# Patient Record
Sex: MALE | URBAN - NONMETROPOLITAN AREA
[De-identification: names, ages, dates, MRNs, and addresses within clinical notes are randomized per-mention and may not be internally consistent; named-entity substitution may affect disease eponyms.]

---

## 2023-09-01 ENCOUNTER — OFFICE VISIT (OUTPATIENT)
Dept: URBAN - NONMETROPOLITAN AREA CLINIC 13 | Facility: CLINIC | Age: 23
End: 2023-09-01
Payer: COMMERCIAL

## 2023-09-01 VITALS
WEIGHT: 175 LBS | HEART RATE: 102 BPM | HEIGHT: 70 IN | SYSTOLIC BLOOD PRESSURE: 133 MMHG | BODY MASS INDEX: 25.05 KG/M2 | DIASTOLIC BLOOD PRESSURE: 96 MMHG

## 2023-09-01 DIAGNOSIS — K21.9 ACID REFLUX: ICD-10-CM

## 2023-09-01 DIAGNOSIS — Z46.59 ENCOUNTER FOR CARE RELATED TO FEEDING TUBE: ICD-10-CM

## 2023-09-01 PROBLEM — 128188000: Status: ACTIVE | Noted: 2023-09-01

## 2023-09-01 PROBLEM — 230435005: Status: ACTIVE | Noted: 2023-09-01

## 2023-09-01 PROBLEM — 235595009: Status: ACTIVE | Noted: 2023-09-01

## 2023-09-01 PROBLEM — 440419004: Status: ACTIVE | Noted: 2023-09-01

## 2023-09-01 PROCEDURE — 99204 OFFICE O/P NEW MOD 45 MIN: CPT

## 2023-09-01 RX ORDER — LANSOPRAZOLE 30 MG/1
TABLET, ORALLY DISINTEGRATING ORAL
Qty: 180 EACH | Status: ACTIVE | COMMUNITY

## 2023-09-01 RX ORDER — GABAPENTIN 250 MG/5ML
TAKE 8 MILLILITERS BY MOUTH AT BEDTIME SOLUTION ORAL
Qty: 240 MILLILITER | Refills: 5 | Status: ACTIVE | COMMUNITY

## 2023-09-01 RX ORDER — TRAZODONE HYDROCHLORIDE 100 MG/1
CRUSHED TWO TABLETS AND GIVE VIA G-TUBE AT BEDTIME TABLET ORAL
Qty: 60 EACH | Refills: 2 | Status: ACTIVE | COMMUNITY

## 2023-09-01 RX ORDER — VALPROIC ACID 250 MG/5ML
SOLUTION ORAL
Qty: 1200 MILLILITER | Status: ACTIVE | COMMUNITY

## 2023-09-01 RX ORDER — LEVETIRACETAM 100 MG/ML
TAKE 10 MILLILITERS (1,000 MG) BY ORAL ROUTE 2 TIMES PER DAY FOR 90 DAYS SOLUTION ORAL
Qty: 1800 MILLILITER | Refills: 1 | Status: ACTIVE | COMMUNITY

## 2023-09-01 RX ORDER — BACLOFEN 20 MG/1
TAKE ONE TABLET BY MOUTH THREE TIMES DAILY TABLET ORAL
Qty: 90 EACH | Refills: 1 | Status: ACTIVE | COMMUNITY

## 2023-09-01 RX ORDER — OMEPRAZOLE 20 MG/1
GIVE 5 ML VIA G-TUBE TWICE DAILY CAPSULE, DELAYED RELEASE ORAL
Qty: 30 EACH | Refills: 5 | Status: ACTIVE | COMMUNITY

## 2023-09-01 RX ORDER — GLYCOPYRROLATE 2 MG/1
TABLET ORAL
Qty: 120 TABLET | Status: ACTIVE | COMMUNITY

## 2023-09-01 RX ORDER — LANSOPRAZOLE 30 MG/1
1 TABLET TABLET, ORALLY DISINTEGRATING, DELAYED RELEASE ORAL ONCE A DAY
Qty: 90 TABLET | Refills: 3 | OUTPATIENT
Start: 2023-09-01

## 2023-09-01 RX ORDER — LORATADINE 10 MG
1 PACKET MIXED WITH 8 OUNCES OF FLUID TABLET,DISINTEGRATING ORAL ONCE A DAY
Qty: 90 | Refills: 3 | OUTPATIENT
Start: 2023-09-01 | End: 2024-08-26

## 2023-09-01 NOTE — HPI-TODAY'S VISIT:
9/1/2023 Jefe presents with his mother and caregiver to establish care previously managed by [facility].  He has a past medical history of cerebral palsy, epilepsy, developmental delay, eating disorder with gastrostomy feeding tube, hemiparesis with left-sided weakness, hydrocephalus, spasticity.  He is nonambulatory using wheelchair and is nonverbal.  He is also incontinent of urine and bowel. Currently he receives feedings through G-Tube with Levi button 18, 4.5 using Thrive through [facility] with [facility].  He does sometimes eat some foods by mouth now which is new for him.  He continues on Prevacid 30 mg for history of GERD and uses 1 cap of MiraLAX daily for bowel regimen.  His mother reports his bowels are well managed and they hold the day of MiraLAX if stool is too loose. Today we discussed obtaining records and ensuring they are able to get supplies with refills needed through our office. They will return in 6 months for regular follow up. BLAINE

## 2023-10-18 ENCOUNTER — TELEPHONE ENCOUNTER (OUTPATIENT)
Dept: URBAN - NONMETROPOLITAN AREA CLINIC 2 | Facility: CLINIC | Age: 23
End: 2023-10-18

## 2023-10-18 RX ORDER — LANSOPRAZOLE 30 MG/1
1 TABLET TABLET, ORALLY DISINTEGRATING, DELAYED RELEASE ORAL TWICE DAILY
Qty: 180 | Refills: 3
Start: 2023-09-01

## 2024-03-08 ENCOUNTER — OV EP (OUTPATIENT)
Dept: URBAN - NONMETROPOLITAN AREA CLINIC 13 | Facility: CLINIC | Age: 24
End: 2024-03-08
Payer: COMMERCIAL

## 2024-03-08 VITALS
HEIGHT: 70 IN | HEART RATE: 102 BPM | SYSTOLIC BLOOD PRESSURE: 128 MMHG | BODY MASS INDEX: 25.05 KG/M2 | DIASTOLIC BLOOD PRESSURE: 90 MMHG | WEIGHT: 175 LBS

## 2024-03-08 DIAGNOSIS — K21.9 GASTROESOPHAGEAL REFLUX DISEASE, UNSPECIFIED WHETHER ESOPHAGITIS PRESENT: ICD-10-CM

## 2024-03-08 DIAGNOSIS — Z46.59 ENCOUNTER FOR CARE RELATED TO FEEDING TUBE: ICD-10-CM

## 2024-03-08 DIAGNOSIS — G40.909 EPILEPSY UNDETERMINED AS TO FOCAL OR GENERALIZED: ICD-10-CM

## 2024-03-08 DIAGNOSIS — G80.9 CEREBRAL PALSY, UNSPECIFIED TYPE: ICD-10-CM

## 2024-03-08 PROCEDURE — 99213 OFFICE O/P EST LOW 20 MIN: CPT

## 2024-03-08 RX ORDER — GLYCOPYRROLATE 2 MG/1
TABLET ORAL
Qty: 120 TABLET | Status: ACTIVE | COMMUNITY

## 2024-03-08 RX ORDER — OMEPRAZOLE 20 MG/1
GIVE 5 ML VIA G-TUBE TWICE DAILY CAPSULE, DELAYED RELEASE ORAL
Qty: 30 EACH | Refills: 5 | Status: ACTIVE | COMMUNITY

## 2024-03-08 RX ORDER — BACLOFEN 20 MG/1
TAKE ONE TABLET BY MOUTH THREE TIMES DAILY TABLET ORAL
Qty: 90 EACH | Refills: 1 | Status: ACTIVE | COMMUNITY

## 2024-03-08 RX ORDER — LANSOPRAZOLE 30 MG/1
1 TABLET TABLET, ORALLY DISINTEGRATING, DELAYED RELEASE ORAL TWICE DAILY
Qty: 180 | Refills: 3 | Status: ACTIVE | COMMUNITY
Start: 2023-09-01

## 2024-03-08 RX ORDER — LEVETIRACETAM 100 MG/ML
TAKE 10 MILLILITERS (1,000 MG) BY ORAL ROUTE 2 TIMES PER DAY FOR 90 DAYS SOLUTION ORAL
Qty: 1800 MILLILITER | Refills: 1 | Status: ACTIVE | COMMUNITY

## 2024-03-08 RX ORDER — LANSOPRAZOLE 30 MG/1
1 TABLET TABLET, ORALLY DISINTEGRATING ORAL TWICE A DAY
Qty: 180 TABLET | Refills: 3

## 2024-03-08 RX ORDER — TRAZODONE HYDROCHLORIDE 100 MG/1
CRUSHED TWO TABLETS AND GIVE VIA G-TUBE AT BEDTIME TABLET ORAL
Qty: 60 EACH | Refills: 2 | Status: ACTIVE | COMMUNITY

## 2024-03-08 RX ORDER — LORATADINE 10 MG
1 PACKET MIXED WITH 8 OUNCES OF FLUID TABLET,DISINTEGRATING ORAL ONCE A DAY
Qty: 90 | Refills: 3 | Status: ACTIVE | COMMUNITY
Start: 2023-09-01 | End: 2024-08-26

## 2024-03-08 RX ORDER — LORATADINE 10 MG
1 PACKET MIXED WITH 8 OUNCES OF FLUID TABLET,DISINTEGRATING ORAL ONCE A DAY
Qty: 90 | Refills: 3
Start: 2023-09-01 | End: 2025-03-03

## 2024-03-08 RX ORDER — GABAPENTIN 250 MG/5ML
TAKE 8 MILLILITERS BY MOUTH AT BEDTIME SOLUTION ORAL
Qty: 240 MILLILITER | Refills: 5 | Status: ACTIVE | COMMUNITY

## 2024-03-08 RX ORDER — LANSOPRAZOLE 30 MG/1
TABLET, ORALLY DISINTEGRATING ORAL
Qty: 180 EACH | Status: ACTIVE | COMMUNITY

## 2024-03-08 RX ORDER — VALPROIC ACID 250 MG/5ML
SOLUTION ORAL
Qty: 1200 MILLILITER | Status: ACTIVE | COMMUNITY

## 2024-03-08 NOTE — HPI-TODAY'S VISIT:
9/1/2023 Jefe presents with his mother and caregiver to establish care previously managed by [facility].  He has a past medical history of cerebral palsy, epilepsy, developmental delay, eating disorder with gastrostomy feeding tube, hemiparesis with left-sided weakness, hydrocephalus, spasticity.  He is nonambulatory using wheelchair and is nonverbal.  He is also incontinent of urine and bowel. Currently he receives feedings through G-Tube with Levi button 18, 4.5 using [facility] through [facility] with [facility].  He does sometimes eat some foods by mouth now which is new for him.  He continues on Prevacid 30 mg for history of GERD and uses 1 cap of MiraLAX daily for bowel regimen.  His mother reports his bowels are well managed and they hold the day of MiraLAX if stool is too loose. Today we discussed obtaining records and ensuring they are able to get supplies with refills needed through our office. They will return in 6 months for regular follow up. SP  3/08/2024 Patient presents for  for followup for constipation and feeding tube.  Patient's caregiver denies any melena or rectal bleeding.  Patient states a good appetite and stable weight.  Bowel movements are regular and managed with Miralax. He will return in 1 yr.

## 2024-06-17 ENCOUNTER — TELEPHONE ENCOUNTER (OUTPATIENT)
Dept: URBAN - NONMETROPOLITAN AREA CLINIC 2 | Facility: CLINIC | Age: 24
End: 2024-06-17

## 2024-06-17 RX ORDER — LANSOPRAZOLE 30 MG/1
1 TABLET TABLET, ORALLY DISINTEGRATING, DELAYED RELEASE ORAL TWICE A DAY
Qty: 180 TABLET | Refills: 3 | OUTPATIENT
Start: 2024-06-17

## 2024-11-20 ENCOUNTER — TELEPHONE ENCOUNTER (OUTPATIENT)
Dept: URBAN - NONMETROPOLITAN AREA CLINIC 2 | Facility: CLINIC | Age: 24
End: 2024-11-20

## 2025-03-07 ENCOUNTER — DASHBOARD ENCOUNTERS (OUTPATIENT)
Age: 25
End: 2025-03-07

## 2025-03-07 ENCOUNTER — OFFICE VISIT (OUTPATIENT)
Dept: URBAN - NONMETROPOLITAN AREA CLINIC 13 | Facility: CLINIC | Age: 25
End: 2025-03-07
Payer: COMMERCIAL

## 2025-03-07 ENCOUNTER — TELEPHONE ENCOUNTER (OUTPATIENT)
Dept: URBAN - NONMETROPOLITAN AREA CLINIC 13 | Facility: CLINIC | Age: 25
End: 2025-03-07

## 2025-03-07 VITALS — WEIGHT: 160 LBS | HEIGHT: 70 IN | BODY MASS INDEX: 22.9 KG/M2

## 2025-03-07 DIAGNOSIS — G80.9 CEREBRAL PALSY, UNSPECIFIED TYPE: ICD-10-CM

## 2025-03-07 DIAGNOSIS — Z46.59 ENCOUNTER FOR CARE RELATED TO FEEDING TUBE: ICD-10-CM

## 2025-03-07 DIAGNOSIS — K21.9 GASTROESOPHAGEAL REFLUX DISEASE, UNSPECIFIED WHETHER ESOPHAGITIS PRESENT: ICD-10-CM

## 2025-03-07 PROCEDURE — 99213 OFFICE O/P EST LOW 20 MIN: CPT

## 2025-03-07 RX ORDER — LANSOPRAZOLE 30 MG/1
1 TABLET TABLET, ORALLY DISINTEGRATING ORAL TWICE A DAY
Qty: 180 TABLET | Refills: 3 | Status: ACTIVE | COMMUNITY

## 2025-03-07 RX ORDER — POLYETHYLENE GLYCOL 3350 17 G/DOSE
1 SCOOP MIXED WITH 8 OUNCES OF FLUID POWDER (GRAM) ORAL ONCE A DAY
Qty: 1 UNSPECIFIED | Refills: 5 | OUTPATIENT
Start: 2025-03-07 | End: 2025-09-03

## 2025-03-07 RX ORDER — BACLOFEN 20 MG/1
TAKE ONE TABLET BY MOUTH THREE TIMES DAILY TABLET ORAL
Qty: 90 EACH | Refills: 1 | Status: ACTIVE | COMMUNITY

## 2025-03-07 RX ORDER — OMEPRAZOLE 20 MG/1
1 CAPSULE 1/2 TO 1 HOUR BEFORE MORNING MEAL CAPSULE, DELAYED RELEASE ORAL ONCE A DAY
Qty: 90 CAPSULE | Refills: 3 | OUTPATIENT
Start: 2025-03-07

## 2025-03-07 RX ORDER — GLYCOPYRROLATE 2 MG/1
TABLET ORAL
Qty: 120 TABLET | Status: ACTIVE | COMMUNITY

## 2025-03-07 RX ORDER — TRAZODONE HYDROCHLORIDE 100 MG/1
CRUSHED TWO TABLETS AND GIVE VIA G-TUBE AT BEDTIME TABLET ORAL
Qty: 60 EACH | Refills: 2 | Status: ACTIVE | COMMUNITY

## 2025-03-07 RX ORDER — LANSOPRAZOLE 30 MG/1
1 TABLET TABLET, ORALLY DISINTEGRATING, DELAYED RELEASE ORAL TWICE A DAY
Qty: 180 TABLET | Refills: 3 | Status: ACTIVE | COMMUNITY
Start: 2024-06-17

## 2025-03-07 RX ORDER — OMEPRAZOLE 20 MG/1
GIVE 5 ML VIA G-TUBE TWICE DAILY CAPSULE, DELAYED RELEASE ORAL
Qty: 30 EACH | Refills: 5 | Status: ACTIVE | COMMUNITY

## 2025-03-07 RX ORDER — VALPROIC ACID 250 MG/5ML
SOLUTION ORAL
Qty: 1200 MILLILITER | Status: ACTIVE | COMMUNITY

## 2025-03-07 RX ORDER — GABAPENTIN 250 MG/5ML
TAKE 8 MILLILITERS BY MOUTH AT BEDTIME SOLUTION ORAL
Qty: 240 MILLILITER | Refills: 5 | Status: ACTIVE | COMMUNITY

## 2025-03-07 RX ORDER — LEVETIRACETAM 100 MG/ML
TAKE 10 MILLILITERS (1,000 MG) BY ORAL ROUTE 2 TIMES PER DAY FOR 90 DAYS SOLUTION ORAL
Qty: 1800 MILLILITER | Refills: 1 | Status: ACTIVE | COMMUNITY

## 2025-03-07 RX ORDER — OMEPRAZOLE 20 MG/1
GIVE 5 ML VIA G-TUBE TWICE DAILY CAPSULE, DELAYED RELEASE ORAL TWICE DAILY
Qty: 1 UNSPECIFIED | Refills: 5

## 2025-03-07 NOTE — HPI-TODAY'S VISIT:
9/1/2023 Jefe presents with his mother and caregiver to establish care previously managed by [facility].  He has a past medical history of cerebral palsy, epilepsy, developmental delay, eating disorder with gastrostomy feeding tube, hemiparesis with left-sided weakness, hydrocephalus, spasticity.  He is nonambulatory using wheelchair and is nonverbal.  He is also incontinent of urine and bowel. Currently he receives feedings through G-Tube with Levi button 18, 4.5 using [facility] through [facility].  He does sometimes eat some foods by mouth now which is new for him.  He continues on Prevacid 30 mg for history of GERD and uses 1 cap of MiraLAX daily for bowel regimen.  His mother reports his bowels are well managed and they hold the day of MiraLAX if stool is too loose. Today we discussed obtaining records and ensuring they are able to get supplies with refills needed through our office. They will return in 6 months for regular follow up. SP  3/08/2024 Patient presents for  for followup for constipation and feeding tube.  Patient's caregiver denies any melena or rectal bleeding.  Patient with  a good appetite and stable weight.  Bowel movements are regular and managed with Miralax. He will return in 1 yr.  3/7/2025 Mr. Victoria returns to clinic for follow-up.  He is mainly here for refills for his feeding tube and feeding supplies along with acid reduction and bowel regimen.  He is here with his mother and caregiver.  They deny any observance of increasing symptoms given their review of his behavior.  She feels he may be having some reflux at night as she sees any swallow.  She has continued to give him omeprazole with some improvement.  He does not seem to have any abdominal pain.  He is not belching.  He continues taking daily MiraLAX and has a regular bowel movement at night.  They have no new complaints today.

## 2025-04-21 ENCOUNTER — TELEPHONE ENCOUNTER (OUTPATIENT)
Dept: URBAN - NONMETROPOLITAN AREA CLINIC 2 | Facility: CLINIC | Age: 25
End: 2025-04-21

## 2025-04-21 RX ORDER — OMEPRAZOLE 20 MG/1
1 CAPSULE 1/2 TO 1 HOUR BEFORE MORNING MEAL CAPSULE, DELAYED RELEASE ORAL ONCE A DAY
Qty: 90 CAPSULE | Refills: 3
Start: 2025-03-07

## 2025-06-26 ENCOUNTER — TELEPHONE ENCOUNTER (OUTPATIENT)
Dept: URBAN - NONMETROPOLITAN AREA CLINIC 2 | Facility: CLINIC | Age: 25
End: 2025-06-26